# Patient Record
Sex: FEMALE | Race: BLACK OR AFRICAN AMERICAN | NOT HISPANIC OR LATINO | URBAN - METROPOLITAN AREA
[De-identification: names, ages, dates, MRNs, and addresses within clinical notes are randomized per-mention and may not be internally consistent; named-entity substitution may affect disease eponyms.]

---

## 2023-05-28 ENCOUNTER — EMERGENCY (EMERGENCY)
Facility: HOSPITAL | Age: 83
LOS: 1 days | Discharge: HOME | End: 2023-05-28
Admitting: EMERGENCY MEDICINE
Payer: MEDICARE

## 2023-05-28 VITALS
WEIGHT: 145.06 LBS | TEMPERATURE: 98 F | RESPIRATION RATE: 18 BRPM | OXYGEN SATURATION: 97 % | DIASTOLIC BLOOD PRESSURE: 59 MMHG | HEART RATE: 72 BPM | SYSTOLIC BLOOD PRESSURE: 120 MMHG

## 2023-05-28 VITALS
OXYGEN SATURATION: 99 % | SYSTOLIC BLOOD PRESSURE: 151 MMHG | RESPIRATION RATE: 17 BRPM | HEART RATE: 61 BPM | DIASTOLIC BLOOD PRESSURE: 82 MMHG | TEMPERATURE: 98 F

## 2023-05-28 LAB
ALBUMIN SERPL ELPH-MCNC: 3.2 G/DL — LOW (ref 3.4–5)
ALP SERPL-CCNC: 105 U/L — SIGNIFICANT CHANGE UP (ref 40–120)
ALT FLD-CCNC: 41 U/L — SIGNIFICANT CHANGE UP (ref 12–42)
ANION GAP SERPL CALC-SCNC: 7 MMOL/L — LOW (ref 9–16)
APPEARANCE UR: CLEAR — SIGNIFICANT CHANGE UP
APTT BLD: 29.9 SEC — SIGNIFICANT CHANGE UP (ref 27.5–35.5)
AST SERPL-CCNC: 60 U/L — HIGH (ref 15–37)
BACTERIA # UR AUTO: ABNORMAL /HPF
BASOPHILS # BLD AUTO: 0.02 K/UL — SIGNIFICANT CHANGE UP (ref 0–0.2)
BASOPHILS NFR BLD AUTO: 0.4 % — SIGNIFICANT CHANGE UP (ref 0–2)
BILIRUB SERPL-MCNC: 0.3 MG/DL — SIGNIFICANT CHANGE UP (ref 0.2–1.2)
BILIRUB UR-MCNC: NEGATIVE — SIGNIFICANT CHANGE UP
BUN SERPL-MCNC: 15 MG/DL — SIGNIFICANT CHANGE UP (ref 7–23)
CALCIUM SERPL-MCNC: 9.4 MG/DL — SIGNIFICANT CHANGE UP (ref 8.5–10.5)
CHLORIDE SERPL-SCNC: 106 MMOL/L — SIGNIFICANT CHANGE UP (ref 96–108)
CK SERPL-CCNC: 72 U/L — SIGNIFICANT CHANGE UP (ref 26–192)
CO2 SERPL-SCNC: 28 MMOL/L — SIGNIFICANT CHANGE UP (ref 22–31)
COLOR SPEC: YELLOW — SIGNIFICANT CHANGE UP
CREAT SERPL-MCNC: 0.84 MG/DL — SIGNIFICANT CHANGE UP (ref 0.5–1.3)
DIFF PNL FLD: NEGATIVE — SIGNIFICANT CHANGE UP
EGFR: 68 ML/MIN/1.73M2 — SIGNIFICANT CHANGE UP
EOSINOPHIL # BLD AUTO: 0.09 K/UL — SIGNIFICANT CHANGE UP (ref 0–0.5)
EOSINOPHIL NFR BLD AUTO: 1.9 % — SIGNIFICANT CHANGE UP (ref 0–6)
EPI CELLS # UR: PRESENT
GLUCOSE BLDC GLUCOMTR-MCNC: 88 MG/DL — SIGNIFICANT CHANGE UP (ref 70–99)
GLUCOSE SERPL-MCNC: 84 MG/DL — SIGNIFICANT CHANGE UP (ref 70–99)
GLUCOSE UR QL: NEGATIVE MG/DL — SIGNIFICANT CHANGE UP
HCT VFR BLD CALC: 36 % — SIGNIFICANT CHANGE UP (ref 34.5–45)
HGB BLD-MCNC: 11.2 G/DL — LOW (ref 11.5–15.5)
IMM GRANULOCYTES NFR BLD AUTO: 0.2 % — SIGNIFICANT CHANGE UP (ref 0–0.9)
INR BLD: 1.02 — SIGNIFICANT CHANGE UP (ref 0.88–1.16)
KETONES UR-MCNC: NEGATIVE MG/DL — SIGNIFICANT CHANGE UP
LEUKOCYTE ESTERASE UR-ACNC: ABNORMAL
LYMPHOCYTES # BLD AUTO: 1.3 K/UL — SIGNIFICANT CHANGE UP (ref 1–3.3)
LYMPHOCYTES # BLD AUTO: 27.1 % — SIGNIFICANT CHANGE UP (ref 13–44)
MAGNESIUM SERPL-MCNC: 2.1 MG/DL — SIGNIFICANT CHANGE UP (ref 1.6–2.6)
MCHC RBC-ENTMCNC: 26.5 PG — LOW (ref 27–34)
MCHC RBC-ENTMCNC: 31.1 GM/DL — LOW (ref 32–36)
MCV RBC AUTO: 85.1 FL — SIGNIFICANT CHANGE UP (ref 80–100)
MONOCYTES # BLD AUTO: 0.75 K/UL — SIGNIFICANT CHANGE UP (ref 0–0.9)
MONOCYTES NFR BLD AUTO: 15.6 % — HIGH (ref 2–14)
NEUTROPHILS # BLD AUTO: 2.63 K/UL — SIGNIFICANT CHANGE UP (ref 1.8–7.4)
NEUTROPHILS NFR BLD AUTO: 54.8 % — SIGNIFICANT CHANGE UP (ref 43–77)
NITRITE UR-MCNC: NEGATIVE — SIGNIFICANT CHANGE UP
NRBC # BLD: 0 /100 WBCS — SIGNIFICANT CHANGE UP (ref 0–0)
PH UR: 6.5 — SIGNIFICANT CHANGE UP (ref 5–8)
PHOSPHATE SERPL-MCNC: 3.4 MG/DL — SIGNIFICANT CHANGE UP (ref 2.5–4.5)
PLATELET # BLD AUTO: 190 K/UL — SIGNIFICANT CHANGE UP (ref 150–400)
POTASSIUM SERPL-MCNC: 4.1 MMOL/L — SIGNIFICANT CHANGE UP (ref 3.5–5.3)
POTASSIUM SERPL-SCNC: 4.1 MMOL/L — SIGNIFICANT CHANGE UP (ref 3.5–5.3)
PROT SERPL-MCNC: 6.9 G/DL — SIGNIFICANT CHANGE UP (ref 6.4–8.2)
PROT UR-MCNC: NEGATIVE MG/DL — SIGNIFICANT CHANGE UP
PROTHROM AB SERPL-ACNC: 11.9 SEC — SIGNIFICANT CHANGE UP (ref 10.5–13.4)
RBC # BLD: 4.23 M/UL — SIGNIFICANT CHANGE UP (ref 3.8–5.2)
RBC # FLD: 16.2 % — HIGH (ref 10.3–14.5)
RBC CASTS # UR COMP ASSIST: 1 /HPF — SIGNIFICANT CHANGE UP (ref 0–4)
SODIUM SERPL-SCNC: 141 MMOL/L — SIGNIFICANT CHANGE UP (ref 132–145)
SP GR SPEC: 1.02 — SIGNIFICANT CHANGE UP (ref 1–1.03)
UROBILINOGEN FLD QL: 1 MG/DL — SIGNIFICANT CHANGE UP (ref 0.2–1)
WBC # BLD: 4.8 K/UL — SIGNIFICANT CHANGE UP (ref 3.8–10.5)
WBC # FLD AUTO: 4.8 K/UL — SIGNIFICANT CHANGE UP (ref 3.8–10.5)
WBC UR QL: 3 /HPF — SIGNIFICANT CHANGE UP (ref 0–5)

## 2023-05-28 PROCEDURE — 72125 CT NECK SPINE W/O DYE: CPT | Mod: 26,MH

## 2023-05-28 PROCEDURE — 99285 EMERGENCY DEPT VISIT HI MDM: CPT

## 2023-05-28 PROCEDURE — 71250 CT THORAX DX C-: CPT | Mod: 26,MH

## 2023-05-28 PROCEDURE — 74176 CT ABD & PELVIS W/O CONTRAST: CPT | Mod: 26,MH

## 2023-05-28 PROCEDURE — 70450 CT HEAD/BRAIN W/O DYE: CPT | Mod: 26,MH

## 2023-05-28 RX ORDER — AMLODIPINE BESYLATE 2.5 MG/1
10 TABLET ORAL ONCE
Refills: 0 | Status: COMPLETED | OUTPATIENT
Start: 2023-05-28 | End: 2023-05-28

## 2023-05-28 RX ADMIN — AMLODIPINE BESYLATE 10 MILLIGRAM(S): 2.5 TABLET ORAL at 14:30

## 2023-05-28 NOTE — ED PROVIDER NOTE - PROGRESS NOTE DETAILS
d/w Dr. Becker from [facility]. All imaging nonacute. labs wnl. trace leuk esterase on ua, bedpan sample, culture sent. d/w RN from [facility] - pt did not receive AM amlodipine. will give in ED as BP is elevated.

## 2023-05-28 NOTE — ED ADULT NURSE NOTE - OBJECTIVE STATEMENT
BIBA from [facility] s/p fall. as per triage, pt found on floor and reported R sided body pain. pt is A,A&Ox1, states "I feel sick", poor historian, unable to say what hurts or if she fell. skin intact, appears comfortable, fall risk precautions in place, placed near nurses station. will continue to monitor

## 2023-05-28 NOTE — ED ADULT TRIAGE NOTE - CHIEF COMPLAINT QUOTE
was found on the floor for unknown amount of time. Pt arrives A+Ox1 c/o pain to right side of body. Pt admitted to [facility] 1 month ago for right femur fracture.

## 2023-05-28 NOTE — ED ADULT NURSE NOTE - NSFALLHARMRISKINTERV_ED_ALL_ED
Assistance OOB with selected safe patient handling equipment if applicable/Assistance with ambulation/Communicate risk of Fall with Harm to all staff, patient, and family/Monitor gait and stability/Monitor for mental status changes and reorient to person, place, and time, as needed/Provide visual cue: red socks, yellow wristband, yellow gown, etc/Reinforce activity limits and safety measures with patient and family/Toileting schedule using arm’s reach rule for commode and bathroom/Use of alarms - bed, stretcher, chair and/or video monitoring/Bed in lowest position, wheels locked, appropriate side rails in place/Call bell, personal items and telephone in reach/Instruct patient to call for assistance before getting out of bed/chair/stretcher/Non-slip footwear applied when patient is off stretcher/[place] to call system/Physically safe environment - no spills, clutter or unnecessary equipment/Purposeful Proactive Rounding/Room/bathroom lighting operational, light cord in reach

## 2023-05-28 NOTE — ED PROVIDER NOTE - CLINICAL SUMMARY MEDICAL DECISION MAKING FREE TEXT BOX
pt presents after unwitnessed fall from [facility] care. pt with h/o dementia and poor historian. no active complaints. no obvious signs of trauma. last seen in bed was 2 hours before being found down. labs  nonacute. trace LE on UA but not a clean catch. culture sent and Eugenio notified. no acute findings on imaging. discussed with Eugenio who will accept pt back to [facility]. noted to be hypertensive - did not receive amlodipine this morning, so will give home dose.

## 2023-05-28 NOTE — ED PROVIDER NOTE - NSICDXPASTMEDICALHX_GEN_ALL_CORE_FT
PAST MEDICAL HISTORY:  CVA (cerebrovascular accident)     Dementia     HLD (hyperlipidemia)     HTN (hypertension)

## 2023-05-28 NOTE — ED PROVIDER NOTE - OBJECTIVE STATEMENT
82yo F with h/o displaced fracture of right femur, HTN, hyperlipedemia, depression, dementia, TIA and CVA without deficits presents after [facility]. [facility] pt reported abd pain but on arrival in ED she denies. she is awake, answers questions, but does not remember why she is in the ER. denies cp, sob, ha, abd pain, extremity pain.

## 2023-05-28 NOTE — ED ADULT NURSE NOTE - CHIEF COMPLAINT QUOTE
was found on the floor for unknown amount of time. Pt arrives A+Ox1 c/o pain to right side of body. Pt admitted to [facility] 1 month ago for right femur fracture.

## 2023-05-28 NOTE — ED PROVIDER NOTE - PATIENT PORTAL LINK FT
You can access the FollowMyHealth Patient Portal offered by [facility] by registering at the following website: [email or web address]. By joining [facility]’s FollowMyHealth portal, you will also be able to view your health information using other applications (apps) compatible with our system.

## 2023-05-29 LAB
CULTURE RESULTS: SIGNIFICANT CHANGE UP
SPECIMEN SOURCE: SIGNIFICANT CHANGE UP

## 2023-05-30 DIAGNOSIS — I10 ESSENTIAL (PRIMARY) HYPERTENSION: ICD-10-CM
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
## 2023-05-30 DIAGNOSIS — E78.5 HYPERLIPIDEMIA, UNSPECIFIED: ICD-10-CM

## 2023-05-30 DIAGNOSIS — F32.A DEPRESSION, UNSPECIFIED: ICD-10-CM

## 2023-05-30 DIAGNOSIS — I49.1 ATRIAL PREMATURE DEPOLARIZATION: ICD-10-CM

## 2023-05-30 DIAGNOSIS — Z88.0 ALLERGY STATUS TO PENICILLIN: ICD-10-CM

## 2023-05-30 DIAGNOSIS — R10.9 UNSPECIFIED ABDOMINAL PAIN: ICD-10-CM

## 2023-05-30 DIAGNOSIS — Z86.73 PERSONAL HISTORY OF TRANSIENT ISCHEMIC ATTACK (TIA), AND CEREBRAL INFARCTION WITHOUT RESIDUAL DEFICITS: ICD-10-CM

## 2023-05-30 DIAGNOSIS — F03.90 UNSPECIFIED DEMENTIA WITHOUT BEHAVIORAL DISTURBANCE: ICD-10-CM
